# Patient Record
Sex: MALE | Race: WHITE | NOT HISPANIC OR LATINO | Employment: FULL TIME | URBAN - METROPOLITAN AREA
[De-identification: names, ages, dates, MRNs, and addresses within clinical notes are randomized per-mention and may not be internally consistent; named-entity substitution may affect disease eponyms.]

---

## 2017-07-22 RX ORDER — FOLIC ACID-PYRIDOXINE-CYANOCOBALAMIN TAB 2.5-25-2 MG 2.5-25-2 MG
TAB ORAL
Qty: 90 TABLET | Refills: 0 | Status: SHIPPED | OUTPATIENT
Start: 2017-07-22 | End: 2017-11-14 | Stop reason: SDUPTHER

## 2017-11-15 RX ORDER — FOLIC ACID-PYRIDOXINE-CYANOCOBALAMIN TAB 2.5-25-2 MG 2.5-25-2 MG
TAB ORAL
Qty: 90 TABLET | Refills: 0 | Status: SHIPPED | OUTPATIENT
Start: 2017-11-15 | End: 2018-03-09 | Stop reason: SDUPTHER

## 2018-03-09 RX ORDER — FOLIC ACID-PYRIDOXINE-CYANOCOBALAMIN TAB 2.5-25-2 MG 2.5-25-2 MG
TAB ORAL
Qty: 90 TABLET | Refills: 1 | Status: SHIPPED | OUTPATIENT
Start: 2018-03-09 | End: 2018-09-04 | Stop reason: SDUPTHER

## 2018-09-05 RX ORDER — FOLIC ACID-PYRIDOXINE-CYANOCOBALAMIN TAB 2.5-25-2 MG 2.5-25-2 MG
TAB ORAL
Qty: 90 TABLET | Refills: 1 | Status: SHIPPED | OUTPATIENT
Start: 2018-09-05 | End: 2020-09-22

## 2018-11-27 PROBLEM — I25.10 ATHEROSCLEROSIS OF NATIVE CORONARY ARTERY OF NATIVE HEART WITHOUT ANGINA PECTORIS: Status: ACTIVE | Noted: 2018-11-27

## 2018-11-27 PROBLEM — Z72.0 TOBACCO USE: Status: ACTIVE | Noted: 2018-11-27

## 2018-11-27 PROBLEM — I10 ESSENTIAL HYPERTENSION: Status: ACTIVE | Noted: 2018-11-27

## 2019-02-18 ENCOUNTER — OFFICE VISIT (OUTPATIENT)
Dept: URGENT CARE | Facility: CLINIC | Age: 54
End: 2019-02-18
Payer: OTHER MISCELLANEOUS

## 2019-02-18 VITALS — OXYGEN SATURATION: 98 %

## 2019-02-18 DIAGNOSIS — T59.4X1A CHLORINE INHALATION LUNG INJURY: ICD-10-CM

## 2019-02-18 DIAGNOSIS — Z56.89 WORK-RELATED CONDITION: Primary | ICD-10-CM

## 2019-02-18 DIAGNOSIS — J70.8 CHLORINE INHALATION LUNG INJURY: ICD-10-CM

## 2019-02-18 PROCEDURE — 99205 PR OFFICE/OUTPT VISIT, NEW, LEVL V, 60-74 MIN: ICD-10-PCS | Mod: 25,S$GLB,,

## 2019-02-18 PROCEDURE — 94010 BREATHING CAPACITY TEST: CPT | Mod: S$GLB,,,

## 2019-02-18 PROCEDURE — 99205 OFFICE O/P NEW HI 60 MIN: CPT | Mod: 25,S$GLB,,

## 2019-02-18 PROCEDURE — 94010 BREATHING CAPACITY TEST: ICD-10-PCS | Mod: S$GLB,,,

## 2019-02-18 PROCEDURE — 71046 XR CHEST PA AND LATERAL: ICD-10-PCS | Mod: FY,S$GLB,, | Performed by: RADIOLOGY

## 2019-02-18 PROCEDURE — 71046 X-RAY EXAM CHEST 2 VIEWS: CPT | Mod: FY,S$GLB,, | Performed by: RADIOLOGY

## 2019-02-18 NOTE — PROGRESS NOTES
Subjective:       Patient ID: Yonatan Patel is a 53 y.o. male.    Chief Complaint: Work Related Injury    Patient presents for evaluation post Chlorine exposure on February 5, 2019. Working on a  at [email or web address]  was turned off. Patient blew into it and trapped Chlorine released and blew back into his face. Couldn't breathe at first then coughed and gagged. Went to ER at [facility] in [place]. Given O2 and breathing treatments along with steroids. Kept overnight and discharged on inhalers and breathing treatments that he did for 3 days. Went back to work the next day. PCP rec'd a pulmonary evaluation. Remainder of details in dictated note.       Review of Systems   Constitution: Positive for diaphoresis (at night).   HENT: Positive for congestion.         Positive for post nasal drip, sinus issues.    Cardiovascular: Positive for dyspnea on exertion and orthopnea.   Respiratory: Positive for cough and shortness of breath.    Skin: Positive for dry skin and itching.   Musculoskeletal: Negative.    Gastrointestinal: Positive for heartburn.   Genitourinary: Positive for frequency and urgency.   Psychiatric/Behavioral: Positive for depression.        Positive for nervousness, Difficulty going to sleep.         Objective:      Physical Exam   Constitutional: He is oriented to person, place, and time. He appears well-developed and well-nourished. He is cooperative.  Non-toxic appearance. He does not appear ill. No distress.   HENT:   Head: Normocephalic and atraumatic.   Right Ear: Hearing, tympanic membrane, external ear and ear canal normal.   Left Ear: Hearing, tympanic membrane, external ear and ear canal normal.   Nose: Nose normal. No mucosal edema, rhinorrhea or nasal deformity. No epistaxis. Right sinus exhibits no maxillary sinus tenderness and no frontal sinus tenderness. Left sinus exhibits no maxillary sinus tenderness and no frontal sinus tenderness.   Mouth/Throat: Uvula  is midline, oropharynx is clear and moist and mucous membranes are normal. No trismus in the jaw. Normal dentition. No uvula swelling. No posterior oropharyngeal erythema.   Eyes: Conjunctivae and lids are normal. Right eye exhibits no discharge. Left eye exhibits no discharge. No scleral icterus.   Sclera clear bilat   Neck: Trachea normal, normal range of motion, full passive range of motion without pain and phonation normal. Neck supple.   Cardiovascular: Normal rate, regular rhythm, normal heart sounds, intact distal pulses and normal pulses.   Pulmonary/Chest: Effort normal and breath sounds normal. No stridor. No respiratory distress. He has no decreased breath sounds. He has no wheezes. He has no rhonchi. He has no rales.   Pft's done, mild restriction, FEV1% 82.9%, FVC 78% of pred. Good effort, able to blow up to 10 seconds.    Performed 6 minute step step:    Baseline 98% sat, P 65    At 6 minutes, 91%, P 122    CXR reviewed with patient. No pulmonary infiltrates, widened mediastinum.    Reviewed records from [facility]. See dictated note.   Abdominal: Soft. Normal appearance and bowel sounds are normal. He exhibits no distension, no pulsatile midline mass and no mass. There is no tenderness.   Musculoskeletal: Normal range of motion. He exhibits no edema or deformity.   Neurological: He is alert and oriented to person, place, and time. He has normal strength and normal reflexes. No cranial nerve deficit or sensory deficit. He exhibits normal muscle tone. He displays a negative Romberg sign. Coordination normal.   Skin: Skin is warm, dry and intact. He is not diaphoretic. No pallor.   Psychiatric: He has a normal mood and affect. His speech is normal and behavior is normal. Judgment and thought content normal. Cognition and memory are normal.   Nursing note and vitals reviewed.      Assessment:       1. Work-related condition    2. Chlorine inhalation lung injury        Plan:     Follow up  with PCP to discuss COPD meds if indicated.           Restrictions: Regular Duty  Follow-up if symptoms worsen or fail to improve.

## 2020-09-25 PROBLEM — R06.02 SOB (SHORTNESS OF BREATH): Status: ACTIVE | Noted: 2020-09-25
